# Patient Record
(demographics unavailable — no encounter records)

---

## 2025-03-19 NOTE — DISCUSSION/SUMMARY
[FreeTextEntry1] : 1)CAD s/p MI with mLAD MARIA T 2015. -stress test unremarkable 2024 remains completely asymptomatic 2)HTN: SBPs stable on increased bb dose -cont metoprolol 25mg daily. -losartan as above 3)HL: last FLP 7/2020: tchol 112 / LDL 36 / HDL 45 / trig 153 -statin d/c'd 1 month ago 2/2 elevated LFTs at [facility]D.. normal 6/2022 restarted 20mg 2 weeks ago 4)HAs: doing well on decreased Imdur dose -cont Imdur 30 daily 5)RHM: diabetes runs heavily in his family and he is worried -HgA1c 5.4 6)LE cramps/pain with known mild-moderate PVD: -f/u with Dr. Cope; compression stockings 7)Colonoscopy: Asymptomatic No signs of ischemia/arrhythmia/HF. At low cardiovascular risk for a low risk procedure; no cardiac contraindication to proceeding. OK to hold plavix 5d prior; would cont asa. [EKG obtained to assist in diagnosis and management of assessed problem(s)] : EKG obtained to assist in diagnosis and management of assessed problem(s)

## 2025-03-19 NOTE — HISTORY OF PRESENT ILLNESS
[FreeTextEntry1] : 51yo man with a PMH of CAD s/p NSTEMI 3/13/2015 w a troponin of 8 (100% occluded mLAD s/p PCI at [place] with a 2.5 x 23 Xience MARIA T), HTN, and HL. Has residual moderate diag and OM disease.  Feeling well; denied chest pain/dyspnea/palpitations/syncope.  Does get occasional chest twinges but they are very atypical and usually occur when lying down.  Compliant with meds.  Follows a great diet; does not exercise.  Lipid profile excellent: tchol 130 / LDL 48 / HDL 64 / trig 88.  6/23/17: Started having persistent chest pains last week; was lifting heavy bags of concrete but is unsure if it is 2/2 CAD vs muscle strain.  Denied dyspnea/palpitations/syncope.  Pain worse with any exertion.  Radiation to throat and left arm.  10/20/17: feeling great.  Occasional chest twinge but otherwise well.  No new events.  VSS.  7/20/18: feeling well.  No acute events.  Compliant with meds.  1/11/19:  feeling well from a cardiac standpoint.  Compliant with meds.  EKG and vitals stable. Bilateral LE pain L>R has returned however... mostly bothers him at night with rest, and not with exertion.  Had seen Dr. Aguilar in the past... noted mild-moderate bilateral PVD.   7/1/19: feeling well; denied chest pain/dyspnea.  Has had some HAs recently with blurry vision.  Seeing optho soon.  12/6/19: has started to feel a pain over his left chest with radiation to back; occurs both at rest and with exertion.  No dyspnea/palpitations/syncope. Nuclear stress test 2/2019:  LVEF 52%; small mildly severe partially reversible defect involving the mid anteroseptal wall. Has noted his SBPs to be creeping up to 130-140s  7/2020: feeling well; compliant with meds  2/5/21: still complaining of some chest pain  11/12/21: c/o chest pains... stress recently normal.  BPs 140s/90s recently  6/8/22: feeling well; compliant with meds  1/24/23: COVID + last month with a lot of coughing... episode of syncope while coughing Seen in the ER... Ariana / echo / EKG all normal  10/12/23: feeling well statin d/c'd 1 month ago 2/2 elevated LFTs at PMD.. normal 6/2022 restarted 20mg 2 weeks ago  7/2/24: feeling great  stress test w/ small apical fixed defect w/ nl LVEF  3/19/25: feeling well no cardiac complaints awaiting screening colonoscopy EKG sinus marissa 44bpm

## 2025-03-19 NOTE — PHYSICAL EXAM
[General Appearance - Well Developed] : well developed [Normal Appearance] : normal appearance [Well Groomed] : well groomed [General Appearance - Well Nourished] : well nourished [No Deformities] : no deformities [General Appearance - In No Acute Distress] : no acute distress [Normal Conjunctiva] : the conjunctiva exhibited no abnormalities [Eyelids - No Xanthelasma] : the eyelids demonstrated no xanthelasmas [Normal Oral Mucosa] : normal oral mucosa [No Oral Pallor] : no oral pallor [No Oral Cyanosis] : no oral cyanosis [Normal Jugular Venous A Waves Present] : normal jugular venous A waves present [Normal Jugular Venous V Waves Present] : normal jugular venous V waves present [No Jugular Venous Mo A Waves] : no jugular venous mo A waves [Abdomen Soft] : soft [Abdomen Tenderness] : non-tender [Abdomen Mass (___ Cm)] : no abdominal mass palpated [Abnormal Walk] : normal gait [Gait - Sufficient For Exercise Testing] : the gait was sufficient for exercise testing [Nail Clubbing] : no clubbing of the fingernails [Cyanosis, Localized] : no localized cyanosis [Petechial Hemorrhages (___cm)] : no petechial hemorrhages [Skin Color & Pigmentation] : normal skin color and pigmentation [] : no rash [No Venous Stasis] : no venous stasis [Skin Lesions] : no skin lesions [No Skin Ulcers] : no skin ulcer [No Xanthoma] : no  xanthoma was observed [5th Left ICS - MCL] : palpated at the 5th LICS in the midclavicular line [No Precordial Heave] : no precordial heave was noted [Rhythm Regular] : regular [Normal S1] : normal S1 [Normal S2] : normal S2 [No Murmur] : no murmurs heard [2+] : left 2+ [No Abnormalities] : the abdominal aorta was not enlarged and no bruit was heard [No Pitting Edema] : no pitting edema present [Normal Rate] : the respiratory rate was normal [Normal Rhythm/Effort] : normal respiratory rhythm and effort [Clear Bilaterally] : the lungs were clear to auscultation bilaterally [Normal to Percussion] : the lungs were normal to percussion [Normal] : palpation of the chest was normal [Oriented To Time, Place, And Person] : oriented to person, place, and time [Affect] : the affect was normal [Mood] : the mood was normal [No Anxiety] : not feeling anxious [S3] : no S3 [S4] : no S4 [Right Carotid Bruit] : no bruit heard over the right carotid [Left Carotid Bruit] : no bruit heard over the left carotid [Right Femoral Bruit] : no bruit heard over the right femoral artery [Left Femoral Bruit] : no bruit heard over the left femoral artery